# Patient Record
Sex: MALE | Race: WHITE
[De-identification: names, ages, dates, MRNs, and addresses within clinical notes are randomized per-mention and may not be internally consistent; named-entity substitution may affect disease eponyms.]

---

## 2019-12-24 ENCOUNTER — HOSPITAL ENCOUNTER (OUTPATIENT)
Dept: HOSPITAL 76 - DI | Age: 34
Discharge: HOME | End: 2019-12-24
Attending: ORTHOPAEDIC SURGERY
Payer: COMMERCIAL

## 2019-12-24 DIAGNOSIS — M65.88: ICD-10-CM

## 2019-12-24 DIAGNOSIS — S66.811A: Primary | ICD-10-CM

## 2019-12-24 NOTE — MRI REPORT
Reason:  WRIST EPL TENDONITIS

Procedure Date:  12/24/2019   

Accession Number:  [id number] / [id number]                    

Procedure:  MRI - Wrist RT W/O CPT Code:  

 

***Final Report***

 

 

FULL RESULT:

 

 

EXAM:

RIGHT WRIST MRI WITHOUT CONTRAST.

 

EXAM DATE: 12/24/2019 09:12 AM.

 

CLINICAL HISTORY: Extensor pollicis longus wrist tendinitis. Pain for 6 

months.

 

COMPARISON: None.

 

TECHNIQUE: Multiplanar, multisequence T1-weighted and fluid-sensitive 

sequences of the wrist without contrast. Other: None.

 

FINDINGS:

Bones: No fractures. Mild edema is in Radha's tubercle in the distal 

radius. Marrow edema is in the radial side of the distal scaphoid. There 

is a cyst in the capitate.

 

Cartilage: The articular cartilage is unremarkable. The triangular 

fibrocartilage complex is unremarkable.

 

Ligaments: The scapholunate and lunotriquetral ligaments are intact. The 

visualized other intrinsic, extrinsic and collateral ligaments are 

unremarkable.

 

Tendons: A longitudinal split tear of the extensor carpi ulnaris tendon 

at the ulnar styloid can be seen on series 401, image 15. Extensor 

pollicis longus tendon has multifocal longitudinal split tearing near the 

Radha's tubercle (series 501, image 10). The tendon is thickened distal 

to Radha's tubercle and has increased T2 signal within it. There is a 

moderate amount of fluid within its tendon sheath. A small amount of 

fluid is also in the extensor compartment 2 and 4 tendon sheaths. The 

other visualized flexor and extensor tendons are unremarkable.

 

Musculature: No edema or fatty atrophy.

 

Other: The contents of the carpal tunnel, including the median nerve, are 

unremarkable. Guyons canal is unremarkable. No ganglion cysts. No joint 

effusions. The subcutaneous tissues are unremarkable.

IMPRESSION:

1. Severe tendinosis, multifocal longitudinal split tearing, and moderate 

tenosynovitis of the extensor pollicis longus tendon. There is reactive 

marrow edema in the adjacent Radha's tubercle.

2. Longitudinal split tear of the extensor carpi ulnaris tendon.

3. Mild tenosynovitis of the extensor compartment 2 and 4 tendon sheaths.

 

[facility]IA

## 2020-02-14 ENCOUNTER — HOSPITAL ENCOUNTER (OUTPATIENT)
Dept: HOSPITAL 76 - SDS | Age: 35
Discharge: HOME | End: 2020-02-14
Attending: ORTHOPAEDIC SURGERY
Payer: COMMERCIAL

## 2020-02-14 VITALS — SYSTOLIC BLOOD PRESSURE: 124 MMHG | DIASTOLIC BLOOD PRESSURE: 81 MMHG
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
## 2020-02-14 DIAGNOSIS — S56.511A: Primary | ICD-10-CM

## 2020-02-14 DIAGNOSIS — G47.30: ICD-10-CM

## 2020-02-14 PROCEDURE — 0LX50ZZ TRANSFER RIGHT LOWER ARM AND WRIST TENDON, OPEN APPROACH: ICD-10-PCS | Performed by: ORTHOPAEDIC SURGERY

## 2020-02-14 RX ADMIN — HYDROMORPHONE HYDROCHLORIDE ONE MG: 1 INJECTION, SOLUTION INTRAMUSCULAR; INTRAVENOUS; SUBCUTANEOUS at 11:19

## 2020-02-14 RX ADMIN — HYDROMORPHONE HYDROCHLORIDE ONE MG: 1 INJECTION, SOLUTION INTRAMUSCULAR; INTRAVENOUS; SUBCUTANEOUS at 11:35

## 2020-02-14 NOTE — OPERATIVE REPORT
Operative Report





- Other


Other Information/Narrative: 


Date of Surgery: 14 February 2020





Pre-Op Diagnosis: Right extensor pollicis longus tendinosis with partial tears





Procedure: Right extensor pollicis longus decompression with transposition





Postop Diagnosis: Same





Primary Surgeon: Carl Bryan





Secondary Surgeon: None





Complications: None





Tourniquet Time: 38 minutes





EBL: 5 cc





Implants: None





Postoperative Protocol: Full range of motion of the thumb and wrist immediately.

 No firm gripping for 2 weeks.  No weapons duty for 6 weeks.





Indication For Surgery: 34-year-old male with a longstanding history of 

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 

changes seen on the MRI.  Surgery was indicated to prevent tendon rupture and 

allow for symptom improvement.  The risks, benefits, and alternatives were 

discussed.  Risks include pain, bleeding, infection, damage to nearby 

structures, numbness, lack of symptom relief, implant complications, nonunion, 

need for further surgery, DVT, PE, stroke, and death.  Written consent was 

obtained.





Procedure in Detail: The patient was met in the pre-operative hold area on the 

day of the procedure.  The operative extremity was signed and questions were 

answered.  The patient was brought to the operating room and a general anesthe

tic was administered.  Supine position was used and all bony prominences were 

padded.  Standard prepping and draping was performed.  A time out confirmed 

patient identification, laterality, procedure, allergies, antibiotics, and 

images.  An Esmarch was used to exsanguinate the limb and the tourniquet was 

elevated to 250 mmHg.





A 5 cm incision was made over the course of the extensor pollicis longus.  

Bipolar was used to obtain hemostasis.  Blunt dissection was used looking for 

branches of the superficial branch of the radial nerve.  No branches were seen. 

Veins were protected.  The extensor retinaculum was identified and I opened the 

fourth compartment to show no significant tendinitis or tendinosis.  I then 

opened to the third compartment and found extensor pollicis longus to be 

thickened and partially tore at Radha's tubercle.  I then released the fascia 

proximally to allow for good transposition and released it distally.  EPL was 

then brought out from behind the third and was tension free.  I then irrigated 

copiously and closed the extensor retinaculum over the fourth.  I then closed 

the third compartment as well to prevent EPL from finding its way back behind 

Radha's tubercle.  Excess tissue was debrided.





The incision was closed with 2-0 Vicryl in the dermis and running Monocryl in 

the skin.  Mastisol and Steri-Strips were applied.  20 cc of local anesthetic 

was placed around the incision.